# Patient Record
Sex: MALE | Race: WHITE | Employment: FULL TIME | URBAN - METROPOLITAN AREA
[De-identification: names, ages, dates, MRNs, and addresses within clinical notes are randomized per-mention and may not be internally consistent; named-entity substitution may affect disease eponyms.]

---

## 2017-07-31 ENCOUNTER — OFFICE VISIT (OUTPATIENT)
Dept: URGENT CARE | Facility: URGENT CARE | Age: 32
End: 2017-07-31
Payer: COMMERCIAL

## 2017-07-31 VITALS
OXYGEN SATURATION: 99 % | DIASTOLIC BLOOD PRESSURE: 76 MMHG | TEMPERATURE: 97.5 F | HEART RATE: 70 BPM | SYSTOLIC BLOOD PRESSURE: 130 MMHG | WEIGHT: 245.6 LBS

## 2017-07-31 DIAGNOSIS — B37.42 CANDIDAL BALANITIS: Primary | ICD-10-CM

## 2017-07-31 PROCEDURE — 99213 OFFICE O/P EST LOW 20 MIN: CPT | Performed by: PHYSICIAN ASSISTANT

## 2017-07-31 RX ORDER — NYSTATIN 100000 U/G
CREAM TOPICAL 3 TIMES DAILY
Qty: 15 G | Refills: 1 | Status: SHIPPED | OUTPATIENT
Start: 2017-07-31 | End: 2017-08-07

## 2017-07-31 RX ORDER — FLUCONAZOLE 150 MG/1
150 TABLET ORAL ONCE
Qty: 1 TABLET | Refills: 0 | Status: SHIPPED | OUTPATIENT
Start: 2017-07-31 | End: 2017-07-31

## 2017-07-31 NOTE — PROGRESS NOTES
SUBJECTIVE:  John Jung is a 32 year old male who presents to the clinic today for mild itching    Past Medical History:   Diagnosis Date     Varicella without mention of complication         Allergies   Allergen Reactions     Penicillins GI Disturbance     Social History   Substance Use Topics     Smoking status: Never Smoker     Smokeless tobacco: Not on file     Alcohol use No       ROS:  CONSTITUTIONAL:NEGATIVE for fever, chills, change in weight  INTEGUMENTARY/SKIN: POSITIVE for rash around penis  GI: NEGATIVE for nausea, abdominal pain, heartburn, or change in bowel habits  : Positive for smell  MUSCULOSKELETAL: NEGATIVE for significant arthralgias or myalgia  NEURO: NEGATIVE for weakness, dizziness or paresthesias    EXAM:   /76  Pulse 70  Temp 97.5  F (36.4  C) (Oral)  Wt 245 lb 9.6 oz (111.4 kg)  SpO2 99%  GENERAL: alert, no acute distress.  SKIN: Rash description:    Distribution: localized  Location: around glans    Color: skin color,  Lesion type: skin color, isolated with no other findings  ABDOMEN:  soft, nontender, no HSM or masses and bowel sounds normal  SKIN: Positive for cystic formation on scrotum  NEURO: Normal strength and tone, sensory exam grossly normal,  normal speech and mentation    ASSESSMENT/PLAN:      ICD-10-CM    1. Candidal balanitis B37.42 nystatin (MYCOSTATIN) cream     fluconazole (DIFLUCAN) 150 MG tablet         1) See today's orders.  2) Follow-up with primary clinic if not improving

## 2017-07-31 NOTE — NURSING NOTE
Chief Complaint   Patient presents with     other     possible yeast infection       Initial /76  Pulse 70  Temp 97.5  F (36.4  C) (Oral)  Wt 245 lb 9.6 oz (111.4 kg)  SpO2 99% There is no height or weight on file to calculate BMI.  Medication Reconciliation: complete   Megan Liao, SMA

## 2017-07-31 NOTE — MR AVS SNAPSHOT
"              After Visit Summary   2017    John Jung    MRN: [id number]           Patient Information     Date Of Birth          1985        Visit Information        Provider Department      2017 10:25 AM Luis Galan PA-C [facility]        Today's Diagnoses     Candidal balanitis    -  1       Follow-ups after your visit        Who to contact     If you have questions or need follow up information about today's clinic visit or your schedule please contact [facility] directly at [phone].  Normal or non-critical lab and imaging results will be communicated to you by [employer], letter or phone within 4 business days after the clinic has received the results. If you do not hear from us within 7 days, please contact the clinic through [employer] or phone. If you have a critical or abnormal lab result, we will notify you by phone as soon as possible.  Submit refill requests through [employer] or call your pharmacy and they will forward the refill request to us. Please allow 3 business days for your refill to be completed.          Additional Information About Your Visit        MyChart Information     [employer] lets you send messages to your doctor, view your test results, renew your prescriptions, schedule appointments and more. To sign up, go to [email or web address] . Click on \"Log in\" on the left side of the screen, which will take you to the Welcome page. Then click on \"Sign up Now\" on the right side of the page.     You will be asked to enter the access code listed below, as well as some personal information. Please follow the directions to create your username and password.     Your access code is: [email or web address]  Expires: 10/29/2017 11:33 AM     Your access code will  in 90 days. If you need help or a new code, please call your [place] clinic or [phone].        Care EveryWhere ID     This is your Care EveryWhere ID. This " could be used by other organizations to access your [facility] medical records  [id number]        Your Vitals Were     Pulse Temperature Pulse Oximetry             70 97.5  F (36.4  C) (Oral) 99%          Blood Pressure from Last 3 Encounters:   07/31/17 130/76   10/23/13 (!) 156/97   04/17/13 (!) 160/98    Weight from Last 3 Encounters:   07/31/17 245 lb 9.6 oz (111.4 kg)   08/19/09 205 lb (93 kg)              Today, you had the following     No orders found for display         Today's Medication Changes          These changes are accurate as of: 7/31/17 11:33 AM.  If you have any questions, ask your nurse or doctor.               Start taking these medicines.        Dose/Directions    fluconazole 150 MG tablet   Commonly known as:  DIFLUCAN   Used for:  Candidal balanitis   Started by:  Luis Galan PA-C        Dose:  150 mg   Take 1 tablet (150 mg) by mouth once for 1 dose   Quantity:  1 tablet   Refills:  0       nystatin cream   Commonly known as:  MYCOSTATIN   Used for:  Candidal balanitis   Started by:  Luis Galan PA-C        Apply topically 3 times daily for 7 days   Quantity:  15 g   Refills:  1            Where to get your medicines      These medications were sent to [facility] [address] [address]     Phone:  [phone]     nystatin cream         Some of these will need a paper prescription and others can be bought over the counter.  Ask your nurse if you have questions.     Bring a paper prescription for each of these medications     fluconazole 150 MG tablet                Primary Care Provider    Physician No Ref-Primary       No address on file        Equal Access to Services     [facility] : Althea Maldonado, wazeinabda luqadaha, qaybtan kaaljack roy. So [facility] [phone].    ATENCIÓN: Si habla español, tiene a patel disposición servicios gratuitos de asistencia  lingüística. Jason al [phone].    We comply with applicable federal civil rights laws and [place] laws. We do not discriminate on the basis of race, color, national origin, age, disability sex, sexual orientation or gender identity.            Thank you!     Thank you for choosing [facility]  for your care. Our goal is always to provide you with excellent care. Hearing back from our patients is one way we can continue to improve our services. Please take a few minutes to complete the written survey that you may receive in the mail after your visit with us. Thank you!             Your Updated Medication List - Protect others around you: Learn how to safely use, store and throw away your medicines at www.disposemymeds.org.          This list is accurate as of: 7/31/17 11:33 AM.  Always use your most recent med list.                   Brand Name Dispense Instructions for use Diagnosis    ADVIL 200 MG capsule   Generic drug:  ibuprofen      1 CAPSULE EVERY 4 TO 6 HOURS AS NEEDED        albuterol 108 (90 BASE) MCG/ACT Inhaler    PROAIR HFA/PROVENTIL HFA/VENTOLIN HFA    1 Inhaler    Inhale 2 puffs into the lungs every 6 hours as needed (PRN cough)        fluconazole 150 MG tablet    DIFLUCAN    1 tablet    Take 1 tablet (150 mg) by mouth once for 1 dose    Candidal balanitis       KEFLEX 500 MG capsule   Generic drug:  cephALEXin      1 CAPSULE EVERY 12 HOURS        nystatin cream    MYCOSTATIN    15 g    Apply topically 3 times daily for 7 days    Candidal balanitis